# Patient Record
Sex: FEMALE | Race: WHITE
[De-identification: names, ages, dates, MRNs, and addresses within clinical notes are randomized per-mention and may not be internally consistent; named-entity substitution may affect disease eponyms.]

---

## 2019-08-20 ENCOUNTER — APPOINTMENT (OUTPATIENT)
Dept: SURGERY | Facility: CLINIC | Age: 49
End: 2019-08-20
Payer: MEDICAID

## 2019-08-20 VITALS
WEIGHT: 145 LBS | HEIGHT: 64 IN | BODY MASS INDEX: 24.75 KG/M2 | DIASTOLIC BLOOD PRESSURE: 81 MMHG | SYSTOLIC BLOOD PRESSURE: 127 MMHG | HEART RATE: 75 BPM

## 2019-08-20 DIAGNOSIS — Z00.00 ENCOUNTER FOR GENERAL ADULT MEDICAL EXAMINATION W/OUT ABNORMAL FINDINGS: ICD-10-CM

## 2019-08-20 DIAGNOSIS — Z85.3 PERSONAL HISTORY OF MALIGNANT NEOPLASM OF BREAST: ICD-10-CM

## 2019-08-20 PROCEDURE — 99204 OFFICE O/P NEW MOD 45 MIN: CPT

## 2019-08-20 RX ORDER — METHOCARBAMOL 500 MG/1
500 TABLET, FILM COATED ORAL
Refills: 0 | Status: ACTIVE | COMMUNITY

## 2019-08-20 RX ORDER — IBUPROFEN 800 MG/1
800 TABLET, FILM COATED ORAL
Refills: 0 | Status: ACTIVE | COMMUNITY

## 2019-08-20 RX ORDER — ESTRADIOL 0.03 MG/D
0.03 PATCH, EXTENDED RELEASE TRANSDERMAL
Refills: 0 | Status: ACTIVE | COMMUNITY

## 2019-08-20 RX ORDER — DULOXETINE HYDROCHLORIDE 30 MG/1
30 CAPSULE, DELAYED RELEASE PELLETS ORAL
Refills: 0 | Status: ACTIVE | COMMUNITY

## 2019-09-07 NOTE — HISTORY OF PRESENT ILLNESS
[FreeTextEntry1] : \par Referring Physician:  Denys Booth DO\par \par Ms. Blair is a 49 year old female presenting today for an initial evaluation. Ms. Blair is s/p left mastectomy in July 2017 for multifocal left breast Stage 1 cancer and TOÑA reconstruction. Mastectomy was performed by Dr. Angeli Guerrero and Reconstruction by Dr. Sisi Ledesma.    Initially, Ms. Blair received a tissue expander at the time of the index mastectomy.  However, she stated her body refjected the implant and was not healing and possibly developed an infection.  The expander was subsequently removed September, 2018 and unilateral TOÑA flap reconstruction was completed March, 2018.  \par \par Ms. Blair also recently underwent mastopexy with implant of the right breast at [facility] by Dr. Sisi Ledesma on 6/3/19.  \par \par Pathology of Left Mastectomy (7/10/17):\par 1.  Left sentinel lymph node #1 - Two lymph nodes, no carcinoma seen (0/2)\par 2.  Left sentinel lymph node #2 - Two lymph nodes, no carcinoma seen (0/2)\par 3.  Left breast, total mastectomy - Invasive ductal carcinoma, high nuclear grade, two microscopic foci measuring 0.1 cm and <0.1 cm; in a background of extensive ductal carcinoma and microcalcifications, involving a 5.0 cm area predominantly in the upper inner quadrant / superior breast;  DCIS involves 13 of 22 slides.\par -  Resection margins are negative for both invasive and in situ ductal carcinoma (> 1.0 cm).\par -  Nipple with single lactiferous duct involved by DCIS; no Paget disease identified.\par -  No lymphovascular invasion is identified\par -  Fibroadenomas, measuring up to 1.2 cm\par -  Non-neoplastic breast tissue with proliferative fibrocystic changes (usual duct hyperplasia, apocrine metaplasia, and benign cysts), with rare microcalcifications.\par -  Benign skin.\par \par Ms. Blair has received no chemo or radiation.  She reports dissatisfaction with surgical cosmetic outcome but also states that she would like to transfer her care for monitoring.\par \par We reviewed pathophysiology of breast cancer which was previously treated and the risk of recurrence on the mastectomy side and new cancer from forming on the right side.  She understands the role of adjuvant treatment and the rationale for not taking hormonal therapy or radiation therapy guided by recommendations at [facility] at the time of her cancer treatment.  In review of final pathology there were two microscopic foci of invasive carcinoma present measuring <1cm (0.1 and less then 0.1cm respectively) in addition to high nuclear grade DCIS ER3% CA 0 involving 5.0cm area and 13/22 slides. Margins were negative for both invasive and DCIS >1cm. \par \par Most Recent Imaging (prior to Right Breast Implant): \par Right Breast Mammogram 3/4/19:\par - 9:00 o'clock anterior depth - 1cm oval equal density mass in the right breast resembling fibroadenoma.  \par \par Right Breast Ultrasound 3/4/19:\par -  9:00 o'clock anterior depth 1 cm from the nipple. - 1.0 cm x 0.4 cm x 0.5 cm oval mass with a lobulated and circumscribed margin in the right breast.  The mass is hypoechoic with an abrupt boundary.  This abnormality is increased in size.  No abnormalities were seen sonographically in the right axilla. 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  At this time, she has been reconstructed with TOÑA flap and she has an overall good appearance. In terms of surveillance, recommendation is for continued high risk surveillance.  I have requested a copy of genetic testing if performed at [facility] and if not, we will order it secondary to her age at diagnosis.\par \par In addition I have recommended diagnostic or screening mammogram/ultrasound of the right breast annually which is due in 4/2019. Since she had recent abnormal outside right breast imaging, I have recommended diagnostic in 4/2020.\par \par She understands and agrees to the plan.\par

## 2019-09-07 NOTE — PHYSICAL EXAM
[Normocephalic] : normocephalic [Atraumatic] : atraumatic [EOMI] : extra ocular movement intact [PERRL] : pupils equal, round and reactive to light [Sclera nonicteric] : sclera nonicteric [Supple] : supple [No Supraclavicular Adenopathy] : no supraclavicular adenopathy [Examined in the supine and seated position] : examined in the supine and seated position [No dominant masses] : no dominant masses left breast [No dominant masses] : no dominant masses in right breast  [No Nipple Retraction] : no left nipple retraction [No Nipple Discharge] : no left nipple discharge [No Axillary Lymphadenopathy] : no left axillary lymphadenopathy [No Edema] : no edema [No Rashes] : no rashes [No Ulceration] : no ulceration [Breast Nipple Inversion] : nipples not inverted [Breast Nipple Retraction] : nipples not retracted [Breast Nipple Flattening] : nipples not flattened [Breast Nipple Fissures] : nipples not fissured [Breast Abnormal Lactation (Galactorrhea)] : no galactorrhea [Breast Abnormal Secretion Bloody Fluid] : no bloody discharge [Breast Abnormal Secretion Serous Fluid] : no serous discharge [Breast Abnormal Secretion Opalescent Fluid] : no milky discharge [de-identified] : No supraclavicular or axillary adenopathy. No dominant masses, normal to palpation. Everted nipple without discharge. No skin changes.  [de-identified] : No supraclavicular or axillary adenopathy. No dominant masses, normal to palpation. Everted nipple without discharge. No skin changes.

## 2019-09-15 PROBLEM — C50.912 INVASIVE DUCTAL CARCINOMA OF BREAST, LEFT: Status: ACTIVE | Noted: 2019-08-20

## 2019-09-15 PROBLEM — Z12.39 BREAST CANCER SCREENING: Status: ACTIVE | Noted: 2019-08-20

## 2019-09-15 PROBLEM — Z90.12 HISTORY OF TOTAL MASTECTOMY OF LEFT BREAST: Status: ACTIVE | Noted: 2019-09-07

## 2019-09-15 NOTE — HISTORY OF PRESENT ILLNESS
[FreeTextEntry1] : \par Referring Physician:  Denys Booth DO\par \par Ms. Blair is a 49 year old female presenting today for follow up.  She was initially seen by me on 8/20/19 for a history of multifocal left breast Stage 1 cancer and is s/p left mastectomy July, 2017 by Dr. Angeli Guerrero and Reconstruction by Dr. Sisi Ledesma.    Initially, Ms. Blair received a tissue expander at the time of the index mastectomy.  However, she stated her body refjected the implant and was not healing and possibly developed an infection.  The expander was subsequently removed September, 2018 and unilateral TOÑA flap reconstruction was completed March, 2018.  \par \par Ms. Blair also recently underwent mastopexy with implant of the right breast at [facility] by Dr. Sisi Ledesma on 6/3/19.  \par \par Pathology of Left Mastectomy (7/10/17):\par 1.  Left sentinel lymph node #1 - Two lymph nodes, no carcinoma seen (0/2)\par 2.  Left sentinel lymph node #2 - Two lymph nodes, no carcinoma seen (0/2)\par 3.  Left breast, total mastectomy - Invasive ductal carcinoma, high nuclear grade, two microscopic foci measuring 0.1 cm and <0.1 cm; in a background of extensive ductal carcinoma and microcalcifications, involving a 5.0 cm area predominantly in the upper inner quadrant / superior breast;  DCIS involves 13 of 22 slides.\par -  Resection margins are negative for both invasive and in situ ductal carcinoma (> 1.0 cm).\par -  Nipple with single lactiferous duct involved by DCIS; no Paget disease identified.\par -  No lymphovascular invasion is identified\par -  Fibroadenomas, measuring up to 1.2 cm\par -  Non-neoplastic breast tissue with proliferative fibrocystic changes (usual duct hyperplasia, apocrine metaplasia, and benign cysts), with rare microcalcifications.\par -  Benign skin.\par \par Ms. Blair has received no chemo or radiation.  She reports dissatisfaction with surgical cosmetic outcome but also states that she would like to transfer her care for monitoring.\par \par During our initial visit, we reviewed pathophysiology of breast cancer which was previously treated and the risk of recurrence on the mastectomy side and new cancer from forming on the right side.  She understood the role of adjuvant treatment and the rationale for not taking hormonal therapy or radiation therapy guided by recommendations at [place] at the time of her cancer treatment.  In review of final pathology there were two microscopic foci of invasive carcinoma present measuring <1cm (0.1 and less then 0.1cm respectively) in addition to high nuclear grade DCIS ER3% CT 0 involving 5.0cm area and 13/22 slides. Margins were negative for both invasive and DCIS >1cm. \par \par Most Recent Imaging (prior to Right Breast Implant): \par Right Breast Mammogram 3/4/19:\par - 9:00 o'clock anterior depth - 1cm oval equal density mass in the right breast resembling fibroadenoma.  \par \par Right Breast Ultrasound 3/4/19:\par -  9:00 o'clock anterior depth 1 cm from the nipple. - 1.0 cm x 0.4 cm x 0.5 cm oval mass with a lobulated and circumscribed margin in the right breast.  The mass is hypoechoic with an abrupt boundary.  This abnormality is increased in size.  No abnormalities were seen sonographically in the right axilla. \par BIRADS 3 \par \par BONE IMAGING WHOLE BODY 4/23/19:\par -  Degenerative joint disease within the bilateral AC joints (right greater than left)\par -  Degenerative joint disease at the iliac crest.\par -  No evidence of osseous metastatic disease.\par \par We rhondaurther discussed that we can set her up for plastic surgery consultation, however the reconstruction that was performed appears well, and the cosmetic issues seems to be correlated to the amount of skin removed in the mastectomy (perhaps due to infected/necrotic skin).  At this time, she has been reconstructed with TOÑA flap and she has an overall good appearance. In terms of surveillance, recommendation is for continued high risk surveillance. \par \par  I have requested a copy of genetic testing if performed at [facility] and if not, we will order it secondary to her age at diagnosis.\par \par In addition I have recommended diagnostic or screening mammogram/ultrasound of the right breast annually which is due4/2020. \par \par She presents today .......\par \par

## 2019-09-17 ENCOUNTER — APPOINTMENT (OUTPATIENT)
Dept: SURGERY | Facility: CLINIC | Age: 49
End: 2019-09-17

## 2019-09-17 DIAGNOSIS — Z12.39 ENCOUNTER FOR OTHER SCREENING FOR MALIGNANT NEOPLASM OF BREAST: ICD-10-CM

## 2019-09-17 DIAGNOSIS — Z90.12 ACQUIRED ABSENCE OF LEFT BREAST AND NIPPLE: ICD-10-CM

## 2019-09-17 DIAGNOSIS — C50.912 MALIGNANT NEOPLASM OF UNSPECIFIED SITE OF LEFT FEMALE BREAST: ICD-10-CM
